# Patient Record
Sex: FEMALE | Race: OTHER
[De-identification: names, ages, dates, MRNs, and addresses within clinical notes are randomized per-mention and may not be internally consistent; named-entity substitution may affect disease eponyms.]

---
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
## 2021-12-31 ENCOUNTER — TELEPHONE (OUTPATIENT)
Dept: URGENT CARE | Facility: CLINIC | Age: 21
End: 2021-12-31

## 2021-12-31 DIAGNOSIS — B37.31 VAGINA, CANDIDIASIS: Primary | ICD-10-CM

## 2021-12-31 RX ORDER — FLUCONAZOLE 150 MG/1
150 TABLET ORAL ONCE
Qty: 2 TABLET | Refills: 0 | Status: SHIPPED | OUTPATIENT
Start: 2021-12-31 | End: 2021-12-31

## 2021-12-31 NOTE — TELEPHONE ENCOUNTER
Discussed positive candida, negative BV, trichomonas and chlamydia with patient after verifying name and .  Diflucan sent to pharmacy, instructed to take as directed.  Will call when remainder of results come back. Verbalizes understanding. All questions and concerns answered and addressed.